# Patient Record
Sex: FEMALE | Race: WHITE | Employment: UNEMPLOYED | URBAN - NONMETROPOLITAN AREA
[De-identification: names, ages, dates, MRNs, and addresses within clinical notes are randomized per-mention and may not be internally consistent; named-entity substitution may affect disease eponyms.]

---

## 2021-11-16 ENCOUNTER — HOSPITAL ENCOUNTER (EMERGENCY)
Age: 26
Discharge: HOME | End: 2021-11-16
Attending: EMERGENCY MEDICINE
Payer: MEDICAID

## 2021-11-16 VITALS
WEIGHT: 165 LBS | DIASTOLIC BLOOD PRESSURE: 86 MMHG | RESPIRATION RATE: 15 BRPM | SYSTOLIC BLOOD PRESSURE: 116 MMHG | OXYGEN SATURATION: 98 % | TEMPERATURE: 97.9 F | HEART RATE: 95 BPM | BODY MASS INDEX: 30.36 KG/M2 | HEIGHT: 62 IN

## 2021-11-16 DIAGNOSIS — Z20.822 COVID-19 RULED OUT: ICD-10-CM

## 2021-11-16 DIAGNOSIS — R09.81 NASAL CONGESTION: ICD-10-CM

## 2021-11-16 DIAGNOSIS — J06.9 VIRAL URI: Primary | ICD-10-CM

## 2021-11-16 LAB — SARS-COV-2, NAAT: NOT DETECTED

## 2021-11-16 PROCEDURE — 87635 SARS-COV-2 COVID-19 AMP PRB: CPT

## 2021-11-16 PROCEDURE — 99283 EMERGENCY DEPT VISIT LOW MDM: CPT

## 2021-11-16 RX ORDER — DEXTROAMPHETAMINE SACCHARATE, AMPHETAMINE ASPARTATE, DEXTROAMPHETAMINE SULFATE AND AMPHETAMINE SULFATE 2.5; 2.5; 2.5; 2.5 MG/1; MG/1; MG/1; MG/1
10 TABLET ORAL DAILY
COMMUNITY

## 2021-11-16 RX ORDER — CITALOPRAM 10 MG/1
10 TABLET ORAL DAILY
COMMUNITY

## 2021-11-16 RX ORDER — AMITRIPTYLINE HYDROCHLORIDE 10 MG/1
10 TABLET, FILM COATED ORAL NIGHTLY
COMMUNITY

## 2021-11-16 RX ORDER — NALTREXONE HYDROCHLORIDE 50 MG/1
50 TABLET, FILM COATED ORAL DAILY
COMMUNITY

## 2021-11-16 ASSESSMENT — ENCOUNTER SYMPTOMS
DIARRHEA: 0
SORE THROAT: 0
EYE DISCHARGE: 0
EYE PAIN: 0
BLOOD IN STOOL: 0
SHORTNESS OF BREATH: 0
RHINORRHEA: 1
WHEEZING: 0
COUGH: 1
ABDOMINAL PAIN: 0

## 2021-11-16 ASSESSMENT — PAIN DESCRIPTION - LOCATION: LOCATION: THROAT;EAR

## 2021-11-16 ASSESSMENT — PAIN SCALES - GENERAL: PAINLEVEL_OUTOF10: 4

## 2021-11-16 ASSESSMENT — PAIN DESCRIPTION - FREQUENCY: FREQUENCY: CONTINUOUS

## 2021-11-16 NOTE — Clinical Note
Lana Saldivar was seen and treated in our emergency department on 11/16/2021. She may return to work on 11/17/2021.  her Covid test was negative. If you have any questions or concerns, please don't hesitate to call.       Amador Katz MD

## 2021-11-17 NOTE — ED NOTES
Pt present to ED c/o nasal congestion and sore throat. She states she was around a co worker who tested for covid. She states she has taken motrin at home and has helped he symptom. She is well appearing and stated her employer stated she needed to be seen to return to work.      Wily Vázquez, SUSAN  11/16/21 2189

## 2021-11-17 NOTE — ED PROVIDER NOTES
[address]  [address] [place] [address] [address]  Phone: [phone]    eMERGENCY dEPARTMENT eNCOUnter           [address] [facility]       Chief Complaint   Patient presents with    Nasal Congestion       Nurses Notes reviewed and I agree except as noted in the HPI. HISTORY OF PRESENT ILLNESS    Celia Kline is a 23 y.o. female who presented via private vehicle with above-mentioned complaint. She presented with 2 days history of nasal congestion and rhinorrhea. She has a slight bilateral earache. She has mild nonproductive cough. She denies chest pain or shortness of breath. She denies fever or chills. She was required to be tested for Covid by her work. REVIEW OF SYSTEMS     Review of Systems   Constitutional: Negative for chills and fever. HENT: Positive for congestion and rhinorrhea. Negative for sore throat. Eyes: Negative for pain and discharge. Respiratory: Positive for cough. Negative for shortness of breath and wheezing. Cardiovascular: Negative for chest pain and palpitations. Gastrointestinal: Negative for abdominal pain, blood in stool and diarrhea. Genitourinary: Negative for dysuria and hematuria. Musculoskeletal: Negative for neck pain and neck stiffness. Neurological: Negative for seizures, syncope and headaches. Psychiatric/Behavioral: Negative for confusion. PAST MEDICAL HISTORY    has a past medical history of Asthma. SURGICAL HISTORY      has a past surgical history that includes Tonsillectomy. CURRENT MEDICATIONS       Discharge Medication List as of 11/16/2021  8:12 PM      CONTINUE these medications which have NOT CHANGED    Details   citalopram (CELEXA) 10 MG tablet Take 10 mg by mouth dailyHistorical Med      amitriptyline (ELAVIL) 10 MG tablet Take 10 mg by mouth nightlyHistorical Med      amphetamine-dextroamphetamine (ADDERALL) 10 MG tablet Take 10 mg by mouth daily. Historical Med      naltrexone (DEPADE) 50 MG tablet Take 50 mg by mouth dailyHistorical Med      ibuprofen (ADVIL;MOTRIN) 200 MG tablet Take 200 mg by mouth every 6 hours as needed. ALLERGIES     has No Known Allergies. FAMILY HISTORY     has no family status information on file. family history is not on file. SOCIAL HISTORY      reports that she has been smoking. She has been smoking about 0.50 packs per day. She does not have any smokeless tobacco history on file. She reports that she does not drink alcohol and does not use drugs. PHYSICAL EXAM     INITIAL VITALS:  height is 5' 2\" (1.575 m) and weight is 165 lb (74.8 kg). Her temperature is 97.9 °F (36.6 °C). Her blood pressure is 116/86 and her pulse is 95. Her respiration is 15 and oxygen saturation is 98%. Physical Exam  Vitals and nursing note reviewed. Constitutional:       General: She is not in acute distress. Appearance: She is well-developed. HENT:      Head: Atraumatic. Nose: Congestion and rhinorrhea present. Eyes:      Conjunctiva/sclera: Conjunctivae normal.      Pupils: Pupils are equal, round, and reactive to light. Neck:      Thyroid: No thyromegaly. Vascular: No JVD. Trachea: No tracheal deviation. Cardiovascular:      Rate and Rhythm: Normal rate and regular rhythm. Heart sounds: No murmur heard. No friction rub. No gallop. Pulmonary:      Effort: Pulmonary effort is normal.      Breath sounds: Normal breath sounds. Musculoskeletal:      Cervical back: Neck supple. Neurological:      Mental Status: She is alert. DIFFERENTIAL DIAGNOSIS:       DIAGNOSTIC RESULTS     LABS:   Labs Reviewed   COVID-19, RAPID   Covid was negative. EMERGENCY DEPARTMENT COURSE:   Vitals:    Vitals:    11/16/21 1908 11/16/21 1911   BP:  116/86   Pulse: 95    Resp: 15    Temp: 97.9 °F (36.6 °C)    SpO2: 98%    Weight: 165 lb (74.8 kg)    Height: 5' 2\" (1.575 m)      Patient was reassured. I discussed the diagnosis and treatment plan with her. She demonstrated understanding. FINAL IMPRESSION      1. Viral URI    2. Nasal congestion          DISPOSITION/PLAN   Discharged home in good condition.     PATIENT REFERRED TO:  Bruna Vizcaino [address] [place] [address]  [phone]      As needed      DISCHARGE MEDICATIONS:  Discharge Medication List as of 11/16/2021  8:12 PM          (Please note that portions of this note were completed with a voice recognition program.  Efforts were made to edit the dictations but occasionally words are mis-transcribed.)    MD Sonny Keenan MD  11/16/21 8663

## 2022-05-01 ENCOUNTER — HOSPITAL ENCOUNTER (EMERGENCY)
Age: 27
Discharge: HOME | End: 2022-05-01
Attending: FAMILY MEDICINE
Payer: MEDICAID

## 2022-05-01 VITALS
BODY MASS INDEX: 29.23 KG/M2 | TEMPERATURE: 96.9 F | SYSTOLIC BLOOD PRESSURE: 110 MMHG | HEART RATE: 88 BPM | OXYGEN SATURATION: 97 % | DIASTOLIC BLOOD PRESSURE: 62 MMHG | HEIGHT: 63 IN | RESPIRATION RATE: 18 BRPM | WEIGHT: 165 LBS

## 2022-05-01 DIAGNOSIS — L03.116 CELLULITIS AND ABSCESS OF LEFT LEG: Primary | ICD-10-CM

## 2022-05-01 DIAGNOSIS — L02.416 CELLULITIS AND ABSCESS OF LEFT LEG: Primary | ICD-10-CM

## 2022-05-01 PROCEDURE — 99283 EMERGENCY DEPT VISIT LOW MDM: CPT

## 2022-05-01 RX ORDER — DOXYCYCLINE 100 MG/1
100 TABLET ORAL 2 TIMES DAILY
Qty: 20 TABLET | Refills: 0 | Status: SHIPPED | OUTPATIENT
Start: 2022-05-01 | End: 2022-05-11

## 2022-05-01 ASSESSMENT — PAIN SCALES - GENERAL: PAINLEVEL_OUTOF10: 3

## 2022-05-01 ASSESSMENT — PAIN DESCRIPTION - FREQUENCY: FREQUENCY: CONTINUOUS

## 2022-05-01 ASSESSMENT — PAIN DESCRIPTION - ORIENTATION: ORIENTATION: LEFT

## 2022-05-01 ASSESSMENT — PAIN - FUNCTIONAL ASSESSMENT: PAIN_FUNCTIONAL_ASSESSMENT: 0-10

## 2022-05-01 ASSESSMENT — PAIN DESCRIPTION - PAIN TYPE: TYPE: ACUTE PAIN

## 2022-05-01 ASSESSMENT — PAIN DESCRIPTION - DESCRIPTORS: DESCRIPTORS: ACHING

## 2022-05-01 ASSESSMENT — ENCOUNTER SYMPTOMS
NAUSEA: 0
VOMITING: 0

## 2022-05-01 NOTE — ED TRIAGE NOTES
Pt comes into ER room 6 walking from triage with an abscess / spider bite on her left thigh. The wound area is swollen with red induration, and painful to the touch. She states that 2 days ago it had some green drainage that was very painful. This abscess area is about 2-3 inches from a new purple flower tattoo on her left thigh that is about 3weeks old.

## 2022-05-01 NOTE — Clinical Note
Duc Marley was seen and treated in our emergency department on 5/1/2022. She may return to work on 05/02/2022. If you have any questions or concerns, please don't hesitate to call.       Steven Earl MD

## 2022-05-01 NOTE — ED NOTES
Pt stable A&O x 3 given discharge and follow up info. Pt voiced no concerns and discharged from ER to self to home. Pt ambulated out of ER with no complications .        Reema Garrido RN  05/01/22 0602

## 2022-05-01 NOTE — ED PROVIDER NOTES
[facility]  eMERGENCY dEPARTMENT eNCOUnter          CHIEF COMPLAINT       Chief Complaint   Patient presents with    Abscess       Nurses Notes reviewed and I agree except as noted in the HPI. HISTORY OF PRESENT ILLNESS    Leila Zarco is a 23 y.o. female who presents with left upper leg rash. Symptoms started in last few days. patient denies fever,chillls. Notes mild pain in area. Denies alleviating measures. REVIEW OF SYSTEMS     Review of Systems   Constitutional: Negative for chills and fever. Gastrointestinal: Negative for nausea and vomiting. Musculoskeletal: Negative for arthralgias and myalgias. Skin: Positive for rash. Negative for wound. Hematological: Negative for adenopathy. Does not bruise/bleed easily. All other systems reviewed and are negative. PAST MEDICAL HISTORY    has a past medical history of Asthma, Depression, and Headache. SURGICAL HISTORY      has a past surgical history that includes Tonsillectomy. CURRENT MEDICATIONS       Previous Medications    AMITRIPTYLINE (ELAVIL) 10 MG TABLET    Take 10 mg by mouth nightly    AMPHETAMINE-DEXTROAMPHETAMINE (ADDERALL) 10 MG TABLET    Take 10 mg by mouth daily. CITALOPRAM (CELEXA) 10 MG TABLET    Take 10 mg by mouth daily    IBUPROFEN (ADVIL;MOTRIN) 200 MG TABLET    Take 200 mg by mouth every 6 hours as needed. NALTREXONE (DEPADE) 50 MG TABLET    Take 50 mg by mouth daily       ALLERGIES     has No Known Allergies. FAMILY HISTORY     has no family status information on file. family history is not on file. SOCIAL HISTORY      reports that she has been smoking. She has been smoking about 0.50 packs per day. She has never used smokeless tobacco. She reports that she does not drink alcohol and does not use drugs. PHYSICAL EXAM     INITIAL VITALS:  height is 5' 3\" (1.6 m) and weight is 165 lb (74.8 kg). Her temporal temperature is 96.9 °F (36.1 °C).  Her blood pressure is 110/62 and her pulse is 88. Her respiration is 18 and oxygen saturation is 97%. Physical Exam  Vitals and nursing note reviewed. Constitutional:       General: She is not in acute distress. Appearance: She is not ill-appearing. Musculoskeletal:         General: No swelling or signs of injury. Normal range of motion. Skin:     Findings: Erythema and rash present. Comments: Left upper leg erythema , non fluctuant. Neurological:      Mental Status: She is alert. Psychiatric:         Mood and Affect: Mood normal.         Behavior: Behavior normal.         DIFFERENTIAL DIAGNOSIS:   Cellulitis, abscess,     DIAGNOSTIC RESULTS       LABS:   Labs Reviewed - No data to display    EMERGENCY DEPARTMENT COURSE:   Vitals:    Vitals:    05/01/22 0942   BP: 110/62   Pulse: 88   Resp: 18   Temp: 96.9 °F (36.1 °C)   TempSrc: Temporal   SpO2: 97%   Weight: 165 lb (74.8 kg)   Height: 5' 3\" (1.6 m)     Well  Appearing,non toxic. Afebrile. 3 cm x 3 cm non fluctuant area of erythema. No regional adenopathy. Appears to be cellulitis will start on doxycycline for skin and mrsa coverage. Warm compresses suggested. PROCEDURES:  None    FINAL IMPRESSION      1. Cellulitis and abscess of left leg          DISPOSITION/PLAN   Home. Care instructions provided. Follow up with PCP or ED if worsening rash or fluctuance.      PATIENT REFERRED TO:  Oracio Vizcaino [address] [place] [address]  [phone]    Call in 3 days  If symptoms worsen, As needed      DISCHARGE MEDICATIONS:  New Prescriptions    DOXYCYCLINE MONOHYDRATE (ADOXA) 100 MG TABLET    Take 1 tablet by mouth 2 times daily for 10 days       (Please note that portions of this note were completed with a voice recognition program.  Efforts were made to edit the dictations but occasionally words are mis-transcribed.)    MD Clara Pinzon MD  05/01/22 2447

## 2022-09-14 ENCOUNTER — HOSPITAL ENCOUNTER (EMERGENCY)
Age: 27
Discharge: HOME | End: 2022-09-14
Attending: EMERGENCY MEDICINE
Payer: MEDICAID

## 2022-09-14 VITALS
DIASTOLIC BLOOD PRESSURE: 66 MMHG | SYSTOLIC BLOOD PRESSURE: 120 MMHG | RESPIRATION RATE: 18 BRPM | HEART RATE: 102 BPM | TEMPERATURE: 98.4 F | BODY MASS INDEX: 30.02 KG/M2 | WEIGHT: 169.4 LBS | HEIGHT: 63 IN | OXYGEN SATURATION: 98 %

## 2022-09-14 DIAGNOSIS — Z34.91 FIRST TRIMESTER PREGNANCY: ICD-10-CM

## 2022-09-14 DIAGNOSIS — R10.12 ABDOMINAL PAIN, LEFT UPPER QUADRANT: Primary | ICD-10-CM

## 2022-09-14 PROCEDURE — 99283 EMERGENCY DEPT VISIT LOW MDM: CPT

## 2022-09-14 PROCEDURE — 81001 URINALYSIS AUTO W/SCOPE: CPT

## 2022-09-14 RX ORDER — ACETAMINOPHEN 325 MG/1
650 TABLET ORAL EVERY 6 HOURS PRN
COMMUNITY

## 2022-09-14 ASSESSMENT — ENCOUNTER SYMPTOMS
DIARRHEA: 0
SORE THROAT: 0
COUGH: 0
VOMITING: 0
ABDOMINAL PAIN: 1
SHORTNESS OF BREATH: 0
NAUSEA: 0
CONSTIPATION: 0

## 2022-09-14 ASSESSMENT — PAIN DESCRIPTION - ONSET: ONSET: GRADUAL

## 2022-09-14 ASSESSMENT — PAIN DESCRIPTION - PAIN TYPE: TYPE: ACUTE PAIN

## 2022-09-14 ASSESSMENT — PAIN SCALES - GENERAL: PAINLEVEL_OUTOF10: 5

## 2022-09-14 ASSESSMENT — PAIN DESCRIPTION - ORIENTATION: ORIENTATION: MID;LEFT;UPPER;LOWER

## 2022-09-14 ASSESSMENT — PAIN DESCRIPTION - DESCRIPTORS: DESCRIPTORS: ACHING;CRAMPING

## 2022-09-14 ASSESSMENT — PAIN DESCRIPTION - FREQUENCY: FREQUENCY: CONTINUOUS

## 2022-09-14 ASSESSMENT — PAIN DESCRIPTION - LOCATION: LOCATION: ABDOMEN

## 2022-09-14 NOTE — ED NOTES
Fetal heart tones assess by Dr Kacie Francis at bedside. Gave patient water to assist with providing a urine specimen.       Agustin Stone RN  09/14/22 0083

## 2022-09-14 NOTE — ED TRIAGE NOTES
Arrives to [facility] for the evaluation of abdominal cramping and is 12 weeks pregnant. Denies vaginal bleeding. This is patients second pregnancy. Cramping started this morning around 1000 and progressively worsening. Mostly on the left side (upper and lower) and mid around belly button. Is constant but is worse with activity and eases up at rest.  With movement pain is rated 5/10, at rest is 2/10. Did take Tylenol 650 mg today around 1600. Resting on cot at this time. Respirations unlabored. Afebrile. Call light in reach.

## 2022-09-14 NOTE — ED PROVIDER NOTES
[facility]  eMERGENCY dEPARTMENT eNCOUnter             Jacob Valle[address]    CHIEF COMPLAINT    Chief Complaint   Patient presents with    Abdominal Cramping     Left side- Upper and lower   Mid Abdomen  12 Weeks pregnant        Nurses Notes reviewed and I agree except as noted in the HPI. HPI    Lori Alvarez is a 33 y.o. female who presents stating she is 12 weeks pregnant and is having some pain in the left upper quadrant of her abdomen, cramping in nature, onset at 10:00 this morning. The pain radiates down into her left lower quadrant and periumbilical area periodically. It is worse with movement and palpation, better with rest and immobilization. No change in bowel habits, no dysuria, no fever. She took Tylenol at about 4:00 this evening with some relief. She is currently in no distress. No nausea, vomiting, fever, or dysuria. She has had no vaginal bleeding. She has had several ultrasounds already during her pregnancy, and knows that her pregnancy is intrauterine. She does have an OB who is following her pregnancy. REVIEW OF SYSTEMS      Review of Systems   Constitutional:  Negative for fever. HENT:  Negative for congestion and sore throat. Respiratory:  Negative for cough and shortness of breath. Cardiovascular:  Negative for chest pain and palpitations. Gastrointestinal:  Positive for abdominal pain. Negative for constipation, diarrhea, nausea and vomiting. Genitourinary:  Negative for dysuria. Neurological: Negative. All other systems reviewed and are negative. PAST MEDICAL HISTORY     has a past medical history of Asthma, Depression, and Headache. SURGICAL HISTORY     has a past surgical history that includes Tonsillectomy.     CURRENT MEDICATIONS    Discharge Medication List as of 9/14/2022  6:48 PM        CONTINUE these medications which have NOT CHANGED    Details   acetaminophen (TYLENOL) 325 MG tablet Take 650 mg by mouth every 6 hours as needed for PainHistorical Med      Prenatal Vit-Fe Fumarate-FA (PRENATAL VITAMIN PO) Take by mouth daily 2 GummiesHistorical Med      citalopram (CELEXA) 10 MG tablet Take 10 mg by mouth dailyHistorical Med      amitriptyline (ELAVIL) 10 MG tablet Take 10 mg by mouth nightlyHistorical Med      amphetamine-dextroamphetamine (ADDERALL) 10 MG tablet Take 10 mg by mouth daily. Historical Med      naltrexone (DEPADE) 50 MG tablet Take 50 mg by mouth dailyHistorical Med      ibuprofen (ADVIL;MOTRIN) 200 MG tablet Take 200 mg by mouth every 6 hours as needed. ALLERGIES    has No Known Allergies. FAMILY HISTORY    has no family status information on file. family history is not on file. SOCIAL HISTORY     reports that she quit smoking 7 days ago. Her smoking use included cigarettes. She smoked an average of .5 packs per day. She has never used smokeless tobacco. She reports that she does not drink alcohol and does not use drugs. PHYSICAL EXAM       INITIAL VITALS: /66   Pulse (!) 102   Temp 98.4 °F (36.9 °C) (Temporal)   Resp 18   Ht 5' 3\" (1.6 m)   Wt 169 lb 6.4 oz (76.8 kg)   LMP 05/24/2022   SpO2 98%   BMI 30.01 kg/m²      Physical Exam  Vitals and nursing note reviewed. Exam conducted with a chaperone present. Constitutional:       General: She is not in acute distress. Appearance: She is not ill-appearing. HENT:      Nose: Nose normal.      Mouth/Throat:      Mouth: Mucous membranes are moist.      Pharynx: No oropharyngeal exudate or posterior oropharyngeal erythema. Eyes:      Conjunctiva/sclera: Conjunctivae normal.      Pupils: Pupils are equal, round, and reactive to light. Cardiovascular:      Rate and Rhythm: Normal rate and regular rhythm. Heart sounds: No murmur heard. Pulmonary:      Effort: Pulmonary effort is normal. No respiratory distress. Breath sounds: Normal breath sounds. No wheezing.    Abdominal:      General: Bowel sounds are normal.      Palpations: Abdomen is soft. Tenderness: There is abdominal tenderness (Left upper quadrant, periumbilical, no rebound, guarding, mass. Lower abdominal tenderness. Fetal heart tones are heard hand-held Doppler just above the pubic brim, 160 beats a minute. ). There is no right CVA tenderness or left CVA tenderness. Musculoskeletal:         General: No swelling or tenderness. Cervical back: Neck supple. Lymphadenopathy:      Cervical: No cervical adenopathy. Skin:     General: Skin is warm and dry. Findings: No rash. Neurological:      General: No focal deficit present. Mental Status: She is alert and oriented to person, place, and time. Psychiatric:         Behavior: Behavior normal.         LABS:     Labs Reviewed   URINALYSIS WITH REFLEX TO CULTURE   Normal    Vitals:    Vitals:    09/14/22 1758 09/14/22 1802   BP: 120/66    Pulse: (!) 102    Resp: 18    Temp:  98.4 °F (36.9 °C)   TempSrc:  Temporal   SpO2: 98%    Weight: 169 lb 6.4 oz (76.8 kg)    Height: 5' 3\" (1.6 m)        EMERGENCY DEPARTMENT COURSE:      She has a known intrauterine pregnancy, no bleeding. Her pain is not pelvic. Fetal heart tones are easily heard. Urine is completely normal.  I have no concerns for ectopic pregnancy or emergent need for ultrasound. I recommended that she follow-up with her own provider if symptoms persist.  I suggested that perhaps some MiraLAX would help. FINAL IMPRESSION      1. Abdominal pain, left upper quadrant    2.  First trimester pregnancy        DISPOSITION/PLAN    DISPOSITION Decision To Discharge 09/14/2022 06:47:00 PM      PATIENT REFERRED TO:    Joyce Whyte [id number]  [phone]      As needed    Your OB provider as scheduled          DISCHARGE MEDICATIONS:    Discharge Medication List as of 9/14/2022  6:48 PM             (Please note that portions of this note were completed with a voice recognition program. Efforts were made to edit the dictations but occasionally words are mis-transcribed.)       Yung Chua MD  09/14/22 2123

## 2022-09-14 NOTE — DISCHARGE INSTRUCTIONS
Tylenol as needed for pain. MiraLAX as needed for constipation. Follow-up with your OB provider as scheduled, or sooner if pain persist.  Follow-up with your primary care provider for non-OB related problems.

## 2022-09-15 LAB
BILIRUBIN URINE: NEGATIVE
BLOOD, URINE: NEGATIVE
CHARACTER, URINE: NORMAL
COLOR: YELLOW
GLUCOSE, URINE: NEGATIVE MG/DL
KETONES, URINE: NEGATIVE
LEUKOCYTE ESTERASE, URINE: NEGATIVE
NITRITE, URINE: NEGATIVE
PH UA: 7 (ref 5–9)
PROTEIN UA: NEGATIVE MG/DL
REFLEX TO URINE C & S: NORMAL
SPECIFIC GRAVITY UA: 1.02 (ref 1–1.03)
UROBILINOGEN, URINE: 0.2 EU/DL (ref 0–1)

## 2023-03-09 ENCOUNTER — HOSPITAL ENCOUNTER (EMERGENCY)
Age: 28
Discharge: HOME | End: 2023-03-09
Attending: EMERGENCY MEDICINE
Payer: MEDICAID

## 2023-03-09 VITALS
OXYGEN SATURATION: 97 % | BODY MASS INDEX: 33.66 KG/M2 | TEMPERATURE: 97.2 F | RESPIRATION RATE: 16 BRPM | SYSTOLIC BLOOD PRESSURE: 121 MMHG | HEART RATE: 99 BPM | HEIGHT: 63 IN | WEIGHT: 190 LBS | DIASTOLIC BLOOD PRESSURE: 80 MMHG

## 2023-03-09 DIAGNOSIS — M79.605 LEFT LEG PAIN: Primary | ICD-10-CM

## 2023-03-09 PROCEDURE — 99284 EMERGENCY DEPT VISIT MOD MDM: CPT

## 2023-03-09 PROCEDURE — 96372 THER/PROPH/DIAG INJ SC/IM: CPT

## 2023-03-09 PROCEDURE — 6360000002 HC RX W HCPCS: Performed by: EMERGENCY MEDICINE

## 2023-03-09 RX ORDER — ENOXAPARIN SODIUM 100 MG/ML
1 INJECTION SUBCUTANEOUS ONCE
Status: COMPLETED | OUTPATIENT
Start: 2023-03-09 | End: 2023-03-09

## 2023-03-09 RX ADMIN — ENOXAPARIN SODIUM 90 MG: 100 INJECTION SUBCUTANEOUS at 19:57

## 2023-03-09 ASSESSMENT — PAIN SCALES - GENERAL: PAINLEVEL_OUTOF10: 3

## 2023-03-09 ASSESSMENT — PAIN - FUNCTIONAL ASSESSMENT: PAIN_FUNCTIONAL_ASSESSMENT: 0-10

## 2023-03-09 ASSESSMENT — PAIN DESCRIPTION - ORIENTATION: ORIENTATION: LEFT;LOWER

## 2023-03-09 ASSESSMENT — PAIN DESCRIPTION - LOCATION: LOCATION: LEG

## 2023-03-10 NOTE — DISCHARGE INSTRUCTIONS
As we discussed your left leg might represent a blood clot.   Please call your OB/GYN for venous duplex ultrasound order or report to the closest ER  Please return to the closest ER as well if you have chest pain or shortness of breath

## 2023-03-10 NOTE — ED PROVIDER NOTES
[address]  [address] [facility] [address]  Phone: [phone]    eMERGENCY dEPARTMENT eNCOUnter           [address] [facility]       Chief Complaint   Patient presents with    Leg Pain     Pt c/o lt calf pain, started yesterday NKI reports felt like a kenny horse, 37 weeks gestation        Nurses Notes reviewed and I agree except as noted in the HPI. HISTORY OF PRESENT ILLNESS    Eriberto Cancino is a 33 y.o. female who is 37-week pregnant Who presents via private vehicle with above-mentioned complaints. She presented with 2 days history of left calf pain. She describes her pain as mild, sharp, intermittent pain which is worse with walking. She did not notice any swelling. She denies chest pain or shortness of breath. She denies trauma. She no personal family history of DVT. She is G2, P1. She was seen by her OB/GYN specialist yesterday and stated that \"everything was fine\". She is concerned about DVT. REVIEW OF SYSTEMS     Negative except as mentioned above. PAST MEDICAL HISTORY    has a past medical history of Asthma, Depression, and Headache. SURGICAL HISTORY      has a past surgical history that includes Tonsillectomy. CURRENT MEDICATIONS       Previous Medications    ACETAMINOPHEN (TYLENOL) 325 MG TABLET    Take 650 mg by mouth every 6 hours as needed for Pain    AMITRIPTYLINE (ELAVIL) 10 MG TABLET    Take 10 mg by mouth nightly    AMPHETAMINE-DEXTROAMPHETAMINE (ADDERALL) 10 MG TABLET    Take 10 mg by mouth daily. CITALOPRAM (CELEXA) 10 MG TABLET    Take 10 mg by mouth daily    IBUPROFEN (ADVIL;MOTRIN) 200 MG TABLET    Take 200 mg by mouth every 6 hours as needed. NALTREXONE (DEPADE) 50 MG TABLET    Take 50 mg by mouth daily    PRENATAL VIT-FE FUMARATE-FA (PRENATAL VITAMIN PO)    Take by mouth daily 2 Gummies       ALLERGIES     has No Known Allergies. FAMILY HISTORY     has no family status information on file.     family history is not on file.    SOCIAL HISTORY      reports that she quit smoking about 6 months ago. Her smoking use included cigarettes. She smoked an average of .5 packs per day. She has never used smokeless tobacco. She reports that she does not drink alcohol and does not use drugs. PHYSICAL EXAM     INITIAL VITALS:  height is 5' 3\" (1.6 m) and weight is 190 lb (86.2 kg). Her tympanic temperature is 97.2 °F (36.2 °C). Her blood pressure is 129/87 and her pulse is 114 (abnormal). Her respiration is 16 and oxygen saturation is 97%. Physical Exam  Vitals and nursing note reviewed. Constitutional:       General: She is not in acute distress. Appearance: She is well-developed. She is not ill-appearing. HENT:      Head: Atraumatic. Eyes:      Conjunctiva/sclera: Conjunctivae normal.      Pupils: Pupils are equal, round, and reactive to light. Neck:      Thyroid: No thyromegaly. Vascular: No JVD. Trachea: No tracheal deviation. Cardiovascular:      Rate and Rhythm: Regular rhythm. Tachycardia present. Pulses: Normal pulses. Heart sounds: No murmur heard. No friction rub. No gallop. Pulmonary:      Effort: Pulmonary effort is normal.      Breath sounds: Normal breath sounds. Abdominal:      General: Bowel sounds are normal.      Palpations: Abdomen is soft. Tenderness: There is no abdominal tenderness. Musculoskeletal:      Cervical back: Neck supple. Comments: Examination of both lower extremities showed no differential difference in the size of both legs. There is tenderness palpation of the left calf. Darinel Maceo' sign is positive. She has normal peripheral pulses. Neurological:      Mental Status: She is alert.          DIFFERENTIAL DIAGNOSIS:       DIAGNOSTIC RESULTS         LABS:   Labs Reviewed - No data to display    EMERGENCY DEPARTMENT COURSE:   Vitals:    Vitals:    03/09/23 1931   BP: 129/87   Pulse: (!) 114   Resp: 16   Temp: 97.2 °F (36.2 °C)   TempSrc: Tympanic   SpO2: 97%   Weight: 190 lb (86.2 kg)   Height: 5' 3\" (1.6 m)     MDM:  Patient presented with left pain as mentioned above. I reviewed her medical history. She has no prior history of DVT or PE. She is 37 weeks pregnant. Her pregnancy is uneventful. I reviewed her vital signs and interpreted as mild tachycardia. She has normal pulse oximetry. Patient is worried about DVT. Unfortunately we do not have vascular ultrasound I told institution. Patient wants to be treated at [facility].  Will call [facility] ER and inquire about venous duplex ultrasound, unfortunately they do not have this modality tonight. I ordered Lovenox injections subcu. Patient understood the need to see her OB/GYN specialist tomorrow for venous duplex ultrasound ordered. She understood the need to report to the closest ER if she develops chest pain or shortness of breath at any point. Patient is stable for discharge. He was given strict return instructions. FINAL IMPRESSION      1. Left leg pain          DISPOSITION/PLAN   Discharged home in good condition.     PATIENT REFERRED TO:    Your OB/GYN specialist        DISCHARGE MEDICATIONS:  New Prescriptions    No medications on file       (Please note that portions of this note were completed with a voice recognition program.  Efforts were made to edit the dictations but occasionally words are mis-transcribed.)    MD Blanka Ramos MD  03/09/23 1225

## 2025-03-18 ENCOUNTER — HOSPITAL ENCOUNTER (EMERGENCY)
Age: 30
Discharge: HOME | End: 2025-03-18
Attending: FAMILY MEDICINE
Payer: MEDICAID

## 2025-03-18 VITALS
HEART RATE: 94 BPM | OXYGEN SATURATION: 100 % | WEIGHT: 190 LBS | TEMPERATURE: 98.1 F | SYSTOLIC BLOOD PRESSURE: 110 MMHG | RESPIRATION RATE: 18 BRPM | DIASTOLIC BLOOD PRESSURE: 76 MMHG | BODY MASS INDEX: 33.66 KG/M2

## 2025-03-18 DIAGNOSIS — K04.7 DENTAL INFECTION: Primary | ICD-10-CM

## 2025-03-18 PROCEDURE — 99283 EMERGENCY DEPT VISIT LOW MDM: CPT

## 2025-03-18 RX ORDER — CLINDAMYCIN HYDROCHLORIDE 150 MG/1
150 CAPSULE ORAL 4 TIMES DAILY
Qty: 40 CAPSULE | Refills: 0 | Status: SHIPPED | OUTPATIENT
Start: 2025-03-18 | End: 2025-03-28

## 2025-03-18 RX ORDER — METFORMIN HYDROCHLORIDE 500 MG/1
500 TABLET, EXTENDED RELEASE ORAL
COMMUNITY

## 2025-03-18 RX ORDER — CHLORHEXIDINE GLUCONATE ORAL RINSE 1.2 MG/ML
15 SOLUTION DENTAL 2 TIMES DAILY
Qty: 420 ML | Refills: 0 | Status: SHIPPED | OUTPATIENT
Start: 2025-03-18 | End: 2025-04-01

## 2025-03-18 RX ORDER — PANTOPRAZOLE SODIUM 40 MG/1
40 FOR SUSPENSION ORAL
COMMUNITY

## 2025-03-18 ASSESSMENT — PAIN SCALES - GENERAL: PAINLEVEL_OUTOF10: 4

## 2025-03-18 ASSESSMENT — PAIN DESCRIPTION - PAIN TYPE: TYPE: ACUTE PAIN

## 2025-03-18 ASSESSMENT — PAIN DESCRIPTION - DESCRIPTORS: DESCRIPTORS: ACHING

## 2025-03-18 ASSESSMENT — PAIN DESCRIPTION - LOCATION: LOCATION: JAW

## 2025-03-18 ASSESSMENT — PAIN DESCRIPTION - ORIENTATION: ORIENTATION: RIGHT;UPPER

## 2025-03-18 ASSESSMENT — PAIN - FUNCTIONAL ASSESSMENT: PAIN_FUNCTIONAL_ASSESSMENT: 0-10

## 2025-03-18 NOTE — ED NOTES
Patient verbalized understanding of discharge instructions and medications prescribed.  Denies questions or concerns at this time.

## 2025-03-18 NOTE — DISCHARGE INSTRUCTIONS
Tammy Woods,    It has been my absolute pleasure to serve you while in the emergency department at [facility] today.  In approximately 2 ounces of warm water, please add 1 teaspoon of table salt, 1 teaspoon of baking soda, 1 to 2 drops of hydrogen peroxide.  Mix thoroughly.  Swish in the mouth vigorously and spit.  Repeat this 4-5 times.  Repeat this whole process 2-3 times per day.  Please do remember to take all medications as prescribed.  Please make sure you follow-up with your PCP within 7 days.  Please follow-up with any and all specialists as we discussed.  Please return to the ER in case of any worsening of symptoms.  I wish you a speedy recovery!    Sincerely,    Dr. Elio Mccall MD.

## 2025-03-18 NOTE — ED TRIAGE NOTES
Patient states she had a tooth crack a few months ago without pain.  Today she woke up to upper swollen jaw and upper tooth pain.

## 2025-03-18 NOTE — ED PROVIDER NOTES
breakfast)    NALTREXONE (DEPADE) 50 MG TABLET    Take 50 mg by mouth daily    PANTOPRAZOLE SODIUM (PROTONIX) 40 MG PACK PACKET    Take 1 packet by mouth every morning (before breakfast)    PRENATAL VIT-FE FUMARATE-FA (PRENATAL VITAMIN PO)    Take by mouth daily 2 Gummies    SERTRALINE (ZOLOFT) 50 MG TABLET    Take 1 tablet by mouth daily       ALLERGIES     has no known allergies.    FAMILY HISTORY     has no family status information on file.    family history is not on file.    SOCIAL HISTORY      reports that she quit smoking about 2 years ago. Her smoking use included cigarettes. She has never used smokeless tobacco. She reports that she does not drink alcohol and does not use drugs.    PHYSICAL EXAM     INITIAL VITALS:  weight is 86.2 kg (190 lb). Her oral temperature is 98.1 °F (36.7 °C). Her blood pressure is 110/76 and her pulse is 94. Her respiration is 18 and oxygen saturation is 100%.      Constitutional: well nourished, well developed.  Eyes: PERRLA, no drainage, no conjunctival injection, symmetrical lids.  ENMT: moist mucous membranes. Normal TM bilaterally, + fractured teeth, gingival erythema and edema.  Neck: symmetric, trachea midline, no thyromegaly.      DIFFERENTIAL DIAGNOSIS:   Dental infection  Dental pain  Fractured teeth    DIAGNOSTICRESULTS     EKG: All EKG's are interpreted by the Emergency Department Physician who either signs or Co-signs this chart in the absence of a cardiologist.  EKG interpreted by Elio Mccall MD:        RADIOLOGY: non-plain film images(s) such as CT, Ultrasound and MRI are read by the radiologist.    No orders to display       LABS:   Labs Reviewed - No data to display    EMERGENCY DEPARTMENT COURSE & MDM:   Vitals:    Vitals:    03/18/25 1533   BP: 110/76   Pulse: 94   Resp: 18   Temp: 98.1 °F (36.7 °C)   TempSrc: Oral   SpO2: 100%   Weight: 86.2 kg (190 lb)       MDM    3:46 PM EDT: The patient was seen and evaluated.  ED Course as of 03/18/25 1547   Tue

## 2025-05-13 ENCOUNTER — HOSPITAL ENCOUNTER (EMERGENCY)
Age: 30
Discharge: HOME | End: 2025-05-13
Attending: EMERGENCY MEDICINE
Payer: MEDICAID

## 2025-05-13 VITALS
DIASTOLIC BLOOD PRESSURE: 72 MMHG | RESPIRATION RATE: 16 BRPM | WEIGHT: 180 LBS | SYSTOLIC BLOOD PRESSURE: 120 MMHG | BODY MASS INDEX: 31.89 KG/M2 | HEART RATE: 87 BPM | TEMPERATURE: 98.2 F | HEIGHT: 63 IN | OXYGEN SATURATION: 100 %

## 2025-05-13 DIAGNOSIS — K02.9 DENTAL CARIES: Primary | ICD-10-CM

## 2025-05-13 PROCEDURE — 99283 EMERGENCY DEPT VISIT LOW MDM: CPT

## 2025-05-13 RX ORDER — CHLORHEXIDINE GLUCONATE ORAL RINSE 1.2 MG/ML
15 SOLUTION DENTAL 2 TIMES DAILY
Qty: 420 ML | Refills: 2 | Status: SHIPPED | OUTPATIENT
Start: 2025-05-13 | End: 2025-06-24

## 2025-05-13 RX ORDER — CLINDAMYCIN HYDROCHLORIDE 300 MG/1
300 CAPSULE ORAL 3 TIMES DAILY
Qty: 21 CAPSULE | Refills: 0 | Status: SHIPPED | OUTPATIENT
Start: 2025-05-13 | End: 2025-05-20

## 2025-05-13 ASSESSMENT — PAIN DESCRIPTION - DESCRIPTORS
DESCRIPTORS: ACHING
DESCRIPTORS: ACHING

## 2025-05-13 ASSESSMENT — PAIN SCALES - GENERAL
PAINLEVEL_OUTOF10: 4
PAINLEVEL_OUTOF10: 4

## 2025-05-13 ASSESSMENT — PAIN DESCRIPTION - PAIN TYPE
TYPE: ACUTE PAIN
TYPE: ACUTE PAIN

## 2025-05-13 ASSESSMENT — PAIN DESCRIPTION - LOCATION
LOCATION: TEETH
LOCATION: TEETH

## 2025-05-13 ASSESSMENT — PAIN - FUNCTIONAL ASSESSMENT
PAIN_FUNCTIONAL_ASSESSMENT: 0-10
PAIN_FUNCTIONAL_ASSESSMENT: 0-10

## 2025-05-13 ASSESSMENT — PAIN DESCRIPTION - ORIENTATION
ORIENTATION: RIGHT;UPPER
ORIENTATION: RIGHT;UPPER

## 2025-05-13 NOTE — ED PROVIDER NOTES
[facility] Emergency Department  [address]  [facility] [address]  Phone: [phone]  EMERGENCY DEPARTMENT ENCOUNTER      Pt Name: Tammy Woods  MRN: [id number]  Birthdate 1995  Date of evaluation: 2025  Provider: Gaston Bush MD    CHIEF COMPLAINT       Chief Complaint   Patient presents with    Dental Injury     Right upper tooth is cracked and painful.          HISTORY OF PRESENT ILLNESS      Tammy Woods is a 29 y.o. female who presents to the emergency department with above-noted complaint.  Has right upper dental pain and discomfort is been going on for couple months.  She has intermittent flareups.  She cannot find a dentist to take care of it.  Chlorhexidine helped her in the past.        REVIEW OF SYSTEMS     Dental pain no swelling  Review of Systems  All systems negative except as marked.     PAST MEDICAL HISTORY     Past Medical History:   Diagnosis Date    Asthma     Depression     Headache          SURGICAL HISTORY       Past Surgical History:   Procedure Laterality Date     SECTION       &     TONSILLECTOMY      WISDOM TOOTH EXTRACTION           CURRENT MEDICATIONS       Previous Medications    ACETAMINOPHEN (TYLENOL) 325 MG TABLET    Take 2 tablets by mouth every 6 hours as needed for Pain    AMITRIPTYLINE (ELAVIL) 10 MG TABLET    Take 1 tablet by mouth nightly    AMPHETAMINE-DEXTROAMPHETAMINE (ADDERALL) 10 MG TABLET    Take 1 tablet by mouth daily.    IBUPROFEN (ADVIL;MOTRIN) 200 MG TABLET    Take 1 tablet by mouth every 6 hours as needed    METFORMIN (GLUCOPHAGE-XR) 500 MG EXTENDED RELEASE TABLET    Take 1 tablet by mouth daily (with breakfast)    PANTOPRAZOLE SODIUM (PROTONIX) 40 MG PACK PACKET    Take 1 packet by mouth every morning (before breakfast)    PRENATAL VIT-FE FUMARATE-FA (PRENATAL VITAMIN PO)    Take by mouth daily 2 Gummies    SERTRALINE (ZOLOFT) 50 MG TABLET    Take 2 tablets by mouth daily       ALLERGIES       Patient

## 2025-05-13 NOTE — DISCHARGE INSTRUCTIONS
Use Tylenol or Motrin for aches or pains.  You may also use some topical Orajel to the area.  Use chlorhexidine to treat potential infection or risk.  I have written a few refills for this in case you get inflamed again.  Take clindamycin oral antibiotics.  Your primary care or dentist will need to represcribe oral antibiotics if needed down the road.